# Patient Record
Sex: FEMALE
[De-identification: names, ages, dates, MRNs, and addresses within clinical notes are randomized per-mention and may not be internally consistent; named-entity substitution may affect disease eponyms.]

---

## 2019-05-31 PROBLEM — Z00.00 ENCOUNTER FOR PREVENTIVE HEALTH EXAMINATION: Status: ACTIVE | Noted: 2019-05-31

## 2019-06-10 ENCOUNTER — APPOINTMENT (OUTPATIENT)
Dept: NEPHROLOGY | Facility: CLINIC | Age: 44
End: 2019-06-10

## 2023-02-27 ENCOUNTER — HOSPITAL ENCOUNTER (EMERGENCY)
Age: 48
Discharge: HOME | End: 2023-02-27
Payer: COMMERCIAL

## 2023-02-27 VITALS
DIASTOLIC BLOOD PRESSURE: 68 MMHG | TEMPERATURE: 97.3 F | SYSTOLIC BLOOD PRESSURE: 133 MMHG | RESPIRATION RATE: 16 BRPM | HEART RATE: 97 BPM | OXYGEN SATURATION: 100 %

## 2023-02-27 DIAGNOSIS — H66.92 LEFT OTITIS MEDIA, UNSPECIFIED OTITIS MEDIA TYPE: Primary | ICD-10-CM

## 2023-02-27 PROCEDURE — 99283 EMERGENCY DEPT VISIT LOW MDM: CPT

## 2023-02-27 RX ORDER — AMOXICILLIN AND CLAVULANATE POTASSIUM 875; 125 MG/1; MG/1
1 TABLET, FILM COATED ORAL 2 TIMES DAILY
Qty: 14 TABLET | Refills: 0 | Status: SHIPPED | OUTPATIENT
Start: 2023-02-27 | End: 2023-03-06

## 2023-02-27 NOTE — ED PROVIDER NOTES
Independent [facility] Visit. [facility]  Department of Emergency Medicine   ED  Encounter Note  Admit Date/RoomTime: 2023  5:34 PM  ED Room: [facility]    NAME: Rom Parrish  : 1975  MRN: [id number]     Chief Complaint:  Otalgia (L ear pain x 1 week states she has fullness and scratchiness. Pt believes there may be in insect in ear. )    History of Present Illness        Rom Parrish is a 49 y.o. old female who presenting to the emergency department with complaint of gradual onset left ear pain which she describes as \"scratchy. \" Symptoms began 1 week ago and have been remaining constant since that time. Patient denies chills, dyspnea, fever, headache, myalgias, nasal congestion, nonproductive cough, productive cough, rhinorrhea, sneezing, sore throat, sweats, tooth pain, or wheezing. Her symptoms are relieved by nothing. She has recent history of nothing pertinent as it pertains to today's visit. ROS   Pertinent positives and negatives are stated within HPI, all other systems reviewed and are negative. Past Medical History:  has a past medical history of IBS (irritable bowel syndrome). Surgical History:  has no past surgical history on file. Social History: Drug use questions deferred to the physician. Family History: family history is not on file. Allergies: Patient has no known allergies. Physical Exam   Oxygen Saturation Interpretation: Normal.        ED Triage Vitals   BP Temp Temp src Heart Rate Resp SpO2 Height Weight   23 1735 23 1724 -- 23 1724 23 1735 23 1724 -- --   133/68 97.3 °F (36.3 °C)  97 16 100 %           Constitutional:  Alert, development consistent with age. Ears:  External Ears: Bilateral normal.                 TM's & External Canals:  normal right TM and external ear canal, abnormal TM left ear - erythematous, air-fluid level demonstrated.   Nose:   There is no abnormalities present  Throat:  Airway patient. Neck:  Normal ROM. Supple. Skin:  No rashes, erythema present, unless noted elsewhere. Lymphatic: No lymphangitis or adenopathy noted. Neurological:  Oriented. Motor functions intact. Lab / Imaging Results   (All laboratory and radiology results have been personally reviewed by myself)  Labs:  No results found for this visit on 02/27/23. Imaging: All Radiology results interpreted by Radiologist unless otherwise noted. No orders to display     ED Course / Medical Decision Making   Medications - No data to display       Consult(s):   None    Procedure(s):   None    MDM:   Patient presents to the emergency department for left ear pain. Differential diagnosis includes but is not limited to foreign body of the left ear, otitis media, otitis externa. Physical examination supports active otitis externa. There were no foreign bodies or abnormalities of the ear canal.  Vital signs are normal.  She is well-appearing without additional complaints. She will be given the prescription below and should follow-up with ENT, Dr. Prachi Manzo. Return for new or worsening symptoms. Plan of Care/Counseling:  Nina Marshall PA-C reviewed today's visit with the patient in addition to providing specific details for the plan of care and counseling regarding the diagnosis and prognosis. Questions are answered at this time and are agreeable with the plan. Assessment      1. Left otitis media, unspecified otitis media type      Plan   Discharged home. Patient condition is good    New Medications     New Prescriptions    AMOXICILLIN-CLAVULANATE (AUGMENTIN) 875-125 MG PER TABLET    Take 1 tablet by mouth 2 times daily for 7 days     Electronically signed by Nina Marshall PA-C   DD: 2/27/23  **This report was transcribed using voice recognition software. Every effort was made to ensure accuracy; however, inadvertent computerized transcription errors may be present.   END OF ED PROVIDER NOTE        Nina Marshall ANA  02/27/23 1756       Lucas Toribio PA-C  02/28/23 [id number]

## 2023-02-27 NOTE — Clinical Note
Myriam Evangelista was seen and treated in our emergency department on 2/27/2023. She may return to work on 02/28/2023. If you have any questions or concerns, please don't hesitate to call.       Latosha Baptiste PA-C